# Patient Record
Sex: FEMALE | Race: OTHER | Employment: UNEMPLOYED | URBAN - METROPOLITAN AREA
[De-identification: names, ages, dates, MRNs, and addresses within clinical notes are randomized per-mention and may not be internally consistent; named-entity substitution may affect disease eponyms.]

---

## 2018-10-18 ENCOUNTER — OFFICE VISIT (OUTPATIENT)
Dept: SURGERY | Age: 48
End: 2018-10-18

## 2018-10-18 VITALS
DIASTOLIC BLOOD PRESSURE: 72 MMHG | BODY MASS INDEX: 30.04 KG/M2 | HEART RATE: 70 BPM | HEIGHT: 60 IN | SYSTOLIC BLOOD PRESSURE: 119 MMHG | WEIGHT: 153 LBS

## 2018-10-18 DIAGNOSIS — N63.20 BREAST MASS, LEFT: Primary | ICD-10-CM

## 2018-10-18 NOTE — PROGRESS NOTES
HISTORY OF PRESENT ILLNESS  is a 53 y.o. female. HPI  NEW patient consult referred by Dr. Cris Lee for LEFT breast lump. She noticed a lump 3 weeks ago to her LEFT breast.  It was small and hard. The mass is much harder to feel today. Denies pain. No skin changes or nipple discharge/retreaction. Patient said she had genetic testing at Dr. Cris Lee office and it was negative. Obstetric History    T0      L0   
 SAB0   TAB0   Ectopic0   Molar0   Multiple0   Live Births0 Obstetric Comments Menarche 7, LMP 10/17/18, # of children 4, age of 4st delivery 26, Hysterectomy/oophorectomy NO/NO, Breast bx NO, history of breast feeding YES, BCP NO, Hormone therapy NO Family History: 
Paternal grandmother  from ovarian cancer in her 66's. Her half sister in [facility] [place] is getting chemo for breast cancer. Age of sister unknown. Brother had bladder cancer. Mammogram, , 19, BIRADS 2 I reviewed the mammogram.  She has very dense breast tissue. Review of Systems Constitutional: Negative. HENT: Negative. Eyes: Negative. Respiratory: Negative. Cardiovascular: Negative. Gastrointestinal: Negative. Genitourinary: Negative. Musculoskeletal: Negative. Skin: Negative. Neurological: Negative. Endo/Heme/Allergies: Negative. Psychiatric/Behavioral: Negative. All other systems reviewed and are negative. Physical Exam  
Pulmonary/Chest: Right breast exhibits no inverted nipple, no mass, no nipple discharge, no skin change and no tenderness. Left breast exhibits mass (1.5 cm smooth mobile mass UOQ). Left breast exhibits no inverted nipple, no nipple discharge, no skin change and no tenderness. Breasts are symmetrical.  
 
 
Lymphadenopathy:  
  She has no cervical adenopathy. She has no axillary adenopathy. Right: No supraclavicular adenopathy present. Left: No supraclavicular adenopathy present. BREAST ULTRASOUND Indication: left breast mass UOQ. Technique:  Bilateral 4 quadrant ultrasound was performed using a high-frequency linear-array near-field transducer Findings: LEFT breast palpable mass is a 1.2 cm simple cyst.  Multiple cysts bilateral breasts. The largest is the palpable cyst. 
Impression: Benign cysts Disposition: Discussed aspiration or observation. Cyst is getting smaller. No aspiration ASSESSMENT and PLAN 
  ICD-10-CM ICD-9-CM 1. Breast mass, left N63.20 611.72   
 
LEFT breast mass is simple cyst. 
Pt has multiple cysts bilateral breast 
She is having some perimenopausal symptoms including hot flashes. Cysts tend to be most common during perimenopause and then stop after menopause. Discussed self breast exam and explained what a noncancerous mass feels like (smooth, round and mobile) compared with a cancerous mass (hard, fixed, bumpy and often associated with a skin dimple). Continue annual mammograms.

## 2018-10-26 ENCOUNTER — DOCUMENTATION ONLY (OUTPATIENT)
Dept: SURGERY | Age: 48
End: 2018-10-26

## 2018-10-26 NOTE — PROGRESS NOTES
Mammogram disc from [address] [place] [address] placed in shred box to be destroyed after her appointment with Dr. Archana Nguyen.